# Patient Record
Sex: FEMALE | Race: WHITE | NOT HISPANIC OR LATINO | URBAN - METROPOLITAN AREA
[De-identification: names, ages, dates, MRNs, and addresses within clinical notes are randomized per-mention and may not be internally consistent; named-entity substitution may affect disease eponyms.]

---

## 2024-02-15 ENCOUNTER — OV NP (OUTPATIENT)
Dept: URBAN - METROPOLITAN AREA CLINIC 118 | Facility: CLINIC | Age: 39
End: 2024-02-15
Payer: COMMERCIAL

## 2024-02-15 VITALS
TEMPERATURE: 98.1 F | WEIGHT: 106.2 LBS | SYSTOLIC BLOOD PRESSURE: 115 MMHG | HEART RATE: 104 BPM | DIASTOLIC BLOOD PRESSURE: 86 MMHG | BODY MASS INDEX: 18.82 KG/M2 | HEIGHT: 63 IN

## 2024-02-15 DIAGNOSIS — R10.9 ACUTE ABDOMINAL PAIN: ICD-10-CM

## 2024-02-15 DIAGNOSIS — F10.10 ETOH ABUSE: ICD-10-CM

## 2024-02-15 DIAGNOSIS — R42 DIZZINESS: ICD-10-CM

## 2024-02-15 DIAGNOSIS — R74.8 ELEVATED LIVER ENZYMES: ICD-10-CM

## 2024-02-15 DIAGNOSIS — R20.0 BILATERAL LEG NUMBNESS: ICD-10-CM

## 2024-02-15 PROBLEM — 15167005: Status: ACTIVE | Noted: 2024-02-15

## 2024-02-15 PROCEDURE — 99205 OFFICE O/P NEW HI 60 MIN: CPT | Performed by: INTERNAL MEDICINE

## 2024-02-15 RX ORDER — NITROFURANTOIN MONOHYDRATE/MACROCRYSTALLINE 25; 75 MG/1; MG/1
1 CAPSULE WITH FOOD CAPSULE ORAL
Status: ACTIVE | COMMUNITY

## 2024-02-15 RX ORDER — MIRTAZAPINE 15 MG/1
1 TABLET AT BEDTIME TABLET, FILM COATED ORAL ONCE A DAY
Status: ACTIVE | COMMUNITY

## 2024-02-15 RX ORDER — SUCRALFATE 1 G/1
DISSOLVE 1 TABLET IN WATER AND TAKE ON AN EMPTY STOMACH TABLET ORAL
Qty: 270 TABLET | Refills: 0 | OUTPATIENT
Start: 2024-02-15 | End: 2024-05-15

## 2024-02-15 RX ORDER — ONDANSETRON 4 MG/1
1 TABLET ON THE TONGUE AND ALLOW TO DISSOLVE TABLET, ORALLY DISINTEGRATING ORAL ONCE A DAY
Status: ACTIVE | COMMUNITY

## 2024-02-15 NOTE — HPI-TODAY'S VISIT:
she rpeorts n/v/abd pain admits that she is a heavy drinker, last drink a week ago, about 2 gallons vodka per week leg weakness and numbness and trouble walking  CT 2/12/24 IMPRESSION:1. No acute findings in the abdomen or pelvis. 2. Hepatic steatosis. 3. Prominence of the bilateral periuterine vasculature. This appearanceis nonspecific but can be seen with pelvic venous congestion syndrome inthe right clinical setting. ComponentRef Range &anits	2/12/24 1006  Qwicic367 - 145 mmol/L	140 Potassium3.5 - 5.1 mmol/L	3.2 Low  Folpiwme32 - 107 mmol/L	90 Low   - 31 mmol/L	30 Wyzxlkv22 - 100 mg/dL	119 High  BUN7 - 25 mg/dL	24 Creatinine0.30 - 1.00 mg/dL	0.55 Calcium8.6 - 10.5 mg/dL	10.3 Total Protein6.4 - 8.9 g/dL	8.0 Albumin3.5 - 5.7 g/dL	4.7 ALT7 - 52 U/L	45 AST13 - 39 U/L	103 High  Alkaline Rbikofhgmxa32 - 104 U/L	104 Total Bilirubin&l.0 mg/dL	2.0 High

## 2024-02-22 LAB
FOLATE (FOLIC ACID), SERUM: 9.8
IRON BIND.CAP.(TIBC): 239
IRON SATURATION: 18
IRON: 42
VITAMIN B1 (THIAMINE), BLOOD: 36
VITAMIN B12: 1974

## 2024-11-11 ENCOUNTER — OFFICE VISIT (OUTPATIENT)
Dept: URBAN - METROPOLITAN AREA CLINIC 118 | Facility: CLINIC | Age: 39
End: 2024-11-11